# Patient Record
Sex: MALE | Race: WHITE | Employment: FULL TIME | URBAN - METROPOLITAN AREA
[De-identification: names, ages, dates, MRNs, and addresses within clinical notes are randomized per-mention and may not be internally consistent; named-entity substitution may affect disease eponyms.]

---

## 2018-04-04 ENCOUNTER — OFFICE VISIT (OUTPATIENT)
Dept: FAMILY MEDICINE CLINIC | Age: 33
End: 2018-04-04
Payer: COMMERCIAL

## 2018-04-04 VITALS
SYSTOLIC BLOOD PRESSURE: 126 MMHG | HEART RATE: 65 BPM | OXYGEN SATURATION: 98 % | WEIGHT: 180 LBS | RESPIRATION RATE: 17 BRPM | TEMPERATURE: 97.6 F | DIASTOLIC BLOOD PRESSURE: 68 MMHG | BODY MASS INDEX: 28.25 KG/M2 | HEIGHT: 67 IN

## 2018-04-04 DIAGNOSIS — Z23 IMMUNIZATION DUE: ICD-10-CM

## 2018-04-04 DIAGNOSIS — Z00.00 WELL ADULT EXAM: ICD-10-CM

## 2018-04-04 DIAGNOSIS — K21.9 GASTROESOPHAGEAL REFLUX DISEASE WITHOUT ESOPHAGITIS: ICD-10-CM

## 2018-04-04 DIAGNOSIS — M25.842 CYST OF JOINT OF HAND, LEFT: Primary | ICD-10-CM

## 2018-04-04 PROCEDURE — 90715 TDAP VACCINE 7 YRS/> IM: CPT | Performed by: INTERNAL MEDICINE

## 2018-04-04 PROCEDURE — 90471 IMMUNIZATION ADMIN: CPT | Performed by: INTERNAL MEDICINE

## 2018-04-04 PROCEDURE — 99202 OFFICE O/P NEW SF 15 MIN: CPT | Performed by: INTERNAL MEDICINE

## 2018-04-04 ASSESSMENT — PATIENT HEALTH QUESTIONNAIRE - PHQ9
2. FEELING DOWN, DEPRESSED OR HOPELESS: 0
SUM OF ALL RESPONSES TO PHQ QUESTIONS 1-9: 0
1. LITTLE INTEREST OR PLEASURE IN DOING THINGS: 0
SUM OF ALL RESPONSES TO PHQ9 QUESTIONS 1 & 2: 0

## 2018-04-05 ASSESSMENT — ENCOUNTER SYMPTOMS
VOMITING: 0
STRIDOR: 0
COUGH: 0
CHEST TIGHTNESS: 0
CHOKING: 0
BLOOD IN STOOL: 0
SHORTNESS OF BREATH: 0
GLOBUS SENSATION: 0
WHEEZING: 0
NAUSEA: 1
HEARTBURN: 1
ANAL BLEEDING: 0
CONSTIPATION: 0
ABDOMINAL DISTENTION: 0
WATER BRASH: 0
ABDOMINAL PAIN: 0
APNEA: 0
HOARSE VOICE: 0
SORE THROAT: 0
BELCHING: 0
DIARRHEA: 0

## 2018-04-19 LAB
BASOPHILS ABSOLUTE: 0.1 /ΜL
BASOPHILS RELATIVE PERCENT: 0.8 %
CHOLESTEROL, TOTAL: 194 MG/DL
CHOLESTEROL/HDL RATIO: 6.7
EOSINOPHILS ABSOLUTE: 0.2 /ΜL
EOSINOPHILS RELATIVE PERCENT: 1.6 %
HCT VFR BLD CALC: 40.5 % (ref 41–53)
HDLC SERPL-MCNC: 29 MG/DL (ref 35–70)
HEMOGLOBIN: 13.6 G/DL (ref 13.5–17.5)
LDL CHOLESTEROL CALCULATED: 140 MG/DL (ref 0–160)
LYMPHOCYTES ABSOLUTE: 2.9 /ΜL
LYMPHOCYTES RELATIVE PERCENT: 29.8 %
MAGNESIUM: 2.1 MG/DL
MCH RBC QN AUTO: 30 PG
MCHC RBC AUTO-ENTMCNC: 33.7 G/DL
MCV RBC AUTO: 89 FL
MONOCYTES ABSOLUTE: 0.9 /ΜL
MONOCYTES RELATIVE PERCENT: 9 %
NEUTROPHILS ABSOLUTE: 5.7 /ΜL
NEUTROPHILS RELATIVE PERCENT: 58.8 %
PDW BLD-RTO: 13.1 %
PLATELET # BLD: 279 K/ΜL
PMV BLD AUTO: 8.4 FL
RBC # BLD: 4.55 10^6/ΜL
TRIGL SERPL-MCNC: 124 MG/DL
VLDLC SERPL CALC-MCNC: 25 MG/DL
WBC # BLD: 9.7 10^3/ML

## 2018-04-20 DIAGNOSIS — K21.9 GASTROESOPHAGEAL REFLUX DISEASE WITHOUT ESOPHAGITIS: ICD-10-CM

## 2018-04-20 DIAGNOSIS — Z00.00 WELL ADULT EXAM: ICD-10-CM

## 2018-04-20 LAB
ALBUMIN SERPL-MCNC: 4.3 G/DL
ALP BLD-CCNC: 80 U/L
ALT SERPL-CCNC: 32 U/L
ANION GAP SERPL CALCULATED.3IONS-SCNC: 7 MMOL/L
AST SERPL-CCNC: 21 U/L
BILIRUB SERPL-MCNC: 0.4 MG/DL (ref 0.1–1.4)
BUN BLDV-MCNC: 16 MG/DL
CALCIUM SERPL-MCNC: 9.3 MG/DL
CHLORIDE BLD-SCNC: 105 MMOL/L
CO2: 29 MMOL/L
CREAT SERPL-MCNC: 1.04 MG/DL
FOLATE: NORMAL
GFR CALCULATED: >60
GLUCOSE BLD-MCNC: 104 MG/DL
POTASSIUM SERPL-SCNC: 4.2 MMOL/L
SODIUM BLD-SCNC: 141 MMOL/L
TOTAL PROTEIN: 6.7
VITAMIN B-12: 436

## 2019-09-13 ENCOUNTER — OFFICE VISIT (OUTPATIENT)
Dept: FAMILY MEDICINE CLINIC | Age: 34
End: 2019-09-13
Payer: COMMERCIAL

## 2019-09-13 VITALS
HEIGHT: 67 IN | DIASTOLIC BLOOD PRESSURE: 78 MMHG | HEART RATE: 98 BPM | RESPIRATION RATE: 16 BRPM | OXYGEN SATURATION: 99 % | WEIGHT: 185 LBS | SYSTOLIC BLOOD PRESSURE: 130 MMHG | BODY MASS INDEX: 29.03 KG/M2 | TEMPERATURE: 95.6 F

## 2019-09-13 DIAGNOSIS — Z30.2 ENCOUNTER FOR VASECTOMY: ICD-10-CM

## 2019-09-13 DIAGNOSIS — L02.91 ABSCESS: Primary | ICD-10-CM

## 2019-09-13 DIAGNOSIS — K21.9 GASTROESOPHAGEAL REFLUX DISEASE WITHOUT ESOPHAGITIS: ICD-10-CM

## 2019-09-13 PROCEDURE — 10060 I&D ABSCESS SIMPLE/SINGLE: CPT | Performed by: INTERNAL MEDICINE

## 2019-09-13 PROCEDURE — 99213 OFFICE O/P EST LOW 20 MIN: CPT | Performed by: INTERNAL MEDICINE

## 2019-09-13 PROCEDURE — G8419 CALC BMI OUT NRM PARAM NOF/U: HCPCS | Performed by: INTERNAL MEDICINE

## 2019-09-13 PROCEDURE — G8428 CUR MEDS NOT DOCUMENT: HCPCS | Performed by: INTERNAL MEDICINE

## 2019-09-13 PROCEDURE — 1036F TOBACCO NON-USER: CPT | Performed by: INTERNAL MEDICINE

## 2019-09-13 RX ORDER — SULFAMETHOXAZOLE AND TRIMETHOPRIM 800; 160 MG/1; MG/1
1 TABLET ORAL 2 TIMES DAILY
Qty: 14 TABLET | Refills: 0 | Status: SHIPPED | OUTPATIENT
Start: 2019-09-13 | End: 2019-09-20

## 2019-09-13 RX ORDER — OMEPRAZOLE 40 MG/1
40 CAPSULE, DELAYED RELEASE ORAL DAILY
Qty: 90 CAPSULE | Refills: 5 | Status: SHIPPED | OUTPATIENT
Start: 2019-09-13 | End: 2022-10-03 | Stop reason: ALTCHOICE

## 2019-09-13 RX ORDER — PANTOPRAZOLE SODIUM 40 MG/1
40 TABLET, DELAYED RELEASE ORAL
Qty: 90 TABLET | Refills: 5 | Status: SHIPPED | OUTPATIENT
Start: 2019-09-13 | End: 2022-10-03 | Stop reason: SDUPTHER

## 2019-09-13 ASSESSMENT — PATIENT HEALTH QUESTIONNAIRE - PHQ9
SUM OF ALL RESPONSES TO PHQ9 QUESTIONS 1 & 2: 0
SUM OF ALL RESPONSES TO PHQ QUESTIONS 1-9: 0
SUM OF ALL RESPONSES TO PHQ QUESTIONS 1-9: 0
1. LITTLE INTEREST OR PLEASURE IN DOING THINGS: 0
2. FEELING DOWN, DEPRESSED OR HOPELESS: 0

## 2019-09-13 ASSESSMENT — ENCOUNTER SYMPTOMS
CHANGE IN BOWEL HABIT: 0
SWOLLEN GLANDS: 0
ABDOMINAL PAIN: 1
SORE THROAT: 0
COLOR CHANGE: 1
CONSTIPATION: 0
DIARRHEA: 0

## 2019-09-13 NOTE — PATIENT INSTRUCTIONS
for help? Call your doctor now or seek immediate medical care if:    · You have signs of worsening infection, such as:  ? Increased pain, swelling, warmth, or redness. ? Red streaks leading from the infected skin. ? Pus draining from the wound. ? A fever.    Watch closely for changes in your health, and be sure to contact your doctor if:    · You do not get better as expected. Where can you learn more? Go to [email or web address] and sign in to your [employer] account. Enter A897 in the GovDelivery box to learn more about \"Skin Abscess: Care Instructions. \"     If you do not have an account, please click on the \"Sign Up Now\" link. Current as of: April 1, 2019  Content Version: 12.1  © 9854-2032 Healthwise, Incorporated. Care instructions adapted under license by [facility] ([facility]). If you have questions about a medical condition or this instruction, always ask your healthcare professional. [address] any warranty or liability for your use of this information.

## 2019-09-13 NOTE — PROGRESS NOTES
Visit Information    Have you changed or started any medications since your last visit including any over-the-counter medicines, vitamins, or herbal medicines? no  Are you having any side effects from any of your medications? -  no  Have you stopped taking any of your   medications? no  Have you seen any other physician or provider since your last visit? No  Have you had any other diagnostic tests since your last visit? No  Have you been seen in the emergency room and/or had an admission to a hospital since we last saw you? No  Have you had your routine dental cleaning in the past 6 months? no    Have you activated your [employer] account? If not, what are your barriers?  No:code      Patient Care Team:  Neymar Bolanos DO as PCP - General (Family Medicine)  Neymar Bolanos DO as PCP - [facility] Provider    Medical History Review  Past Medical, Family, and Social History reviewed and does contribute to the patient presenting condition    Health Maintenance   Topic Date Due    Varicella Vaccine (1 of 2 - 13+ 2-dose series) 1998    HIV screen  2020 (Originally 2000)    Flu vaccine (1) 2020 (Originally 2019)    DTaP/Tdap/Td vaccine (2 - Td) 2028    Pneumococcal 0-64 years Vaccine  Aged Out
(PROTONIX) 40 MG tablet Take 1 tablet by mouth every morning (before breakfast) 90 tablet 5     No current facility-administered medications for this visit. No Known Allergies       Health Maintenance   Topic Date Due    Varicella Vaccine (1 of 2 - 13+ 2-dose series) 07/01/1998    HIV screen  04/23/2020 (Originally 7/1/2000)    Flu vaccine (1) 09/13/2020 (Originally 9/1/2019)    DTaP/Tdap/Td vaccine (2 - Td) 04/04/2028    Pneumococcal 0-64 years Vaccine  Aged Out       Subjective:     Review of Systems   Constitutional: Negative for activity change, appetite change, fatigue, fever and unexpected weight change. HENT: Negative for sore throat. Eyes: Negative for visual disturbance. Cardiovascular: Negative for chest pain. Gastrointestinal: Positive for abdominal pain. Negative for anorexia, change in bowel habit, constipation and diarrhea. Musculoskeletal: Negative for arthralgias and joint swelling. Skin: Positive for color change and wound. Negative for rash. Neurological: Negative for headaches. Psychiatric/Behavioral: Negative for agitation, behavioral problems, confusion, decreased concentration, dysphoric mood and sleep disturbance. Objective:      Physical Exam   Constitutional: He is oriented to person, place, and time. He appears well-developed and well-nourished. HENT:   Right Ear: External ear normal.   Left Ear: External ear normal.   Nose: Nose normal.   Eyes: Pupils are equal, round, and reactive to light. EOM are normal.   Cardiovascular: Normal rate, regular rhythm and normal heart sounds. Pulmonary/Chest: Effort normal and breath sounds normal.   Abdominal: Soft. Bowel sounds are normal. There is no splenomegaly or hepatomegaly. There is no tenderness. Neurological: He is alert and oriented to person, place, and time. No cranial nerve deficit. Skin: Skin is warm and dry. Lesion noted. No rash noted. There is erythema.         Psychiatric: He has a normal mood

## 2022-10-03 ENCOUNTER — OFFICE VISIT (OUTPATIENT)
Dept: FAMILY MEDICINE CLINIC | Age: 37
End: 2022-10-03
Payer: COMMERCIAL

## 2022-10-03 VITALS
HEART RATE: 78 BPM | DIASTOLIC BLOOD PRESSURE: 90 MMHG | BODY MASS INDEX: 29.73 KG/M2 | WEIGHT: 189.4 LBS | HEIGHT: 67 IN | TEMPERATURE: 98.2 F | OXYGEN SATURATION: 98 % | SYSTOLIC BLOOD PRESSURE: 120 MMHG

## 2022-10-03 DIAGNOSIS — K21.9 GASTROESOPHAGEAL REFLUX DISEASE WITHOUT ESOPHAGITIS: Primary | ICD-10-CM

## 2022-10-03 DIAGNOSIS — Z87.891 FORMER SMOKER: ICD-10-CM

## 2022-10-03 DIAGNOSIS — R03.0 ELEVATED BLOOD PRESSURE READING: ICD-10-CM

## 2022-10-03 DIAGNOSIS — F17.290 VAPING NICOTINE DEPENDENCE, TOBACCO PRODUCT: ICD-10-CM

## 2022-10-03 DIAGNOSIS — L72.3 SEBACEOUS CYST OF FINGER OF RIGHT HAND: ICD-10-CM

## 2022-10-03 PROCEDURE — 99214 OFFICE O/P EST MOD 30 MIN: CPT | Performed by: INTERNAL MEDICINE

## 2022-10-03 RX ORDER — PANTOPRAZOLE SODIUM 40 MG/1
40 TABLET, DELAYED RELEASE ORAL
Qty: 90 TABLET | Refills: 1 | Status: SHIPPED | OUTPATIENT
Start: 2022-10-03

## 2022-10-03 SDOH — ECONOMIC STABILITY: FOOD INSECURITY: WITHIN THE PAST 12 MONTHS, YOU WORRIED THAT YOUR FOOD WOULD RUN OUT BEFORE YOU GOT MONEY TO BUY MORE.: NEVER TRUE

## 2022-10-03 SDOH — ECONOMIC STABILITY: FOOD INSECURITY: WITHIN THE PAST 12 MONTHS, THE FOOD YOU BOUGHT JUST DIDN'T LAST AND YOU DIDN'T HAVE MONEY TO GET MORE.: NEVER TRUE

## 2022-10-03 ASSESSMENT — PATIENT HEALTH QUESTIONNAIRE - PHQ9
2. FEELING DOWN, DEPRESSED OR HOPELESS: 0
SUM OF ALL RESPONSES TO PHQ9 QUESTIONS 1 & 2: 0
SUM OF ALL RESPONSES TO PHQ QUESTIONS 1-9: 0
SUM OF ALL RESPONSES TO PHQ QUESTIONS 1-9: 0
1. LITTLE INTEREST OR PLEASURE IN DOING THINGS: 0
SUM OF ALL RESPONSES TO PHQ QUESTIONS 1-9: 0
SUM OF ALL RESPONSES TO PHQ QUESTIONS 1-9: 0

## 2022-10-03 ASSESSMENT — ENCOUNTER SYMPTOMS
CHEST TIGHTNESS: 0
NAUSEA: 0
COUGH: 0
SHORTNESS OF BREATH: 0
ANAL BLEEDING: 0
CONSTIPATION: 0
BLOOD IN STOOL: 0
VOMITING: 0
ABDOMINAL PAIN: 0
CHOKING: 0
DIARRHEA: 0
WHEEZING: 0

## 2022-10-03 ASSESSMENT — VISUAL ACUITY: OU: 1

## 2022-10-03 ASSESSMENT — SOCIAL DETERMINANTS OF HEALTH (SDOH): HOW HARD IS IT FOR YOU TO PAY FOR THE VERY BASICS LIKE FOOD, HOUSING, MEDICAL CARE, AND HEATING?: NOT HARD AT ALL

## 2022-10-03 NOTE — PROGRESS NOTES
[facility]  [facility] [address]  [address] [place] [id number]  Dept: [phone]  Dept Fax: [phone]      Tonya Rose is a 40 y.o. male who presents today for hismedical conditions/complaints as noted below. Tonya Rose is c/o of Establish Care        Assessment/Plan:     1. Gastroesophageal reflux disease without esophagitis  -     pantoprazole (PROTONIX) 40 MG tablet; Take 1 tablet by mouth every morning (before breakfast), Disp-90 tablet, R-1Cancel prilosec script from previousNormal  2. Sebaceous cyst of finger of right hand  -     [address] [place], [place]  3. Former smoker  4. Vaping nicotine dependence, tobacco product  5. Elevated blood pressure reading        No follow-ups on file. HPI     New aptient, here to est care   GERD - well controlled with pantoprazole - he ran out a while back and has been having heartburn nearly daily despite taking pepcid. Denies heartburn with food, nocturnal reflux, dysphagia, weight changes, anorexia, melena, hematochezia, diarrhea, nausea, vomiting. Worse with with certain foods like spicy foods. Does not wake him up at night. Hand Pain   There was no injury mechanism. Pain location: R 4th finger at proximal phalange at area of swelling. The quality of the pain is described as aching. The pain does not radiate. The pain has been Constant since the incident. Pertinent negatives include no chest pain, muscle weakness, numbness or tingling. The symptoms are aggravated by movement. He has tried nothing for the symptoms. BP Readings from Last 3 Encounters:   10/03/22 (!) 120/90   09/13/19 130/78   04/04/18 126/68              No past medical history on file.    Past Surgical History:   Procedure Laterality Date    FINGER SURGERY Left     Thumb       Family History   Problem Relation Age of Onset    No Known Problems Mother     COPD Father     No Known Problems Sister     No Known Problems Sister        Social History     Tobacco Use    Smoking status: Former     Packs/day: 1.00     Years: 17.00     Pack years: 17.00     Types: Cigarettes     Quit date:      Years since quittin.7    Smokeless tobacco: Never   Substance Use Topics    Alcohol use: Yes     Alcohol/week: 4.0 - 5.0 standard drinks     Types: 4 - 5 Cans of beer per week        No Known Allergies  Prior to Visit Medications    Medication Sig Taking? Authorizing Provider   omeprazole (PRILOSEC) 40 MG delayed release capsule Take 1 capsule by mouth daily  Patient not taking: Reported on 10/3/2022  Patrick Tate DO   pantoprazole (PROTONIX) 40 MG tablet Take 1 tablet by mouth every morning (before breakfast)  Patient not taking: Reported on 10/3/2022  Patrick Tate DO       Review of Systems     Review of Systems   Constitutional:  Negative for fatigue, fever and unexpected weight change. Respiratory:  Negative for cough, choking, chest tightness, shortness of breath and wheezing. Cardiovascular:  Negative for chest pain, palpitations and leg swelling. Gastrointestinal:  Negative for abdominal pain, anal bleeding, blood in stool, constipation, diarrhea, nausea and vomiting. Endocrine: Negative. Musculoskeletal:  Negative for joint swelling and myalgias. Skin: Negative. Neurological:  Negative for dizziness, tingling and numbness. Psychiatric/Behavioral:  Negative for sleep disturbance. All other systems reviewed and are negative. Objective     BP (!) 120/90 (Site: Left Upper Arm, Position: Sitting, Cuff Size: Medium Adult)   Pulse 78   Temp 98.2 °F (36.8 °C)   Ht 5' 7\" (1.702 m)   Wt 189 lb 6.4 oz (85.9 kg)   SpO2 98%   BMI 29.66 kg/m²   Physical Exam  Vitals and nursing note reviewed. Constitutional:       General: He is not in acute distress. Appearance: He is well-developed. He is not ill-appearing. Eyes:      General: Lids are normal. Vision grossly intact.    Cardiovascular:      Rate and Rhythm: Normal rate and regular rhythm. Heart sounds: Normal heart sounds, S1 normal and S2 normal. No murmur heard. No friction rub. No gallop. Pulmonary:      Effort: Pulmonary effort is normal. No respiratory distress. Breath sounds: Normal breath sounds. No wheezing. Abdominal:      General: Bowel sounds are normal.      Palpations: Abdomen is soft. There is no mass. Tenderness: There is no abdominal tenderness. There is no guarding. Musculoskeletal:         General: Normal range of motion. Right hand: Deformity (7mm round lesion R 4th finger, mildly erythematous, no tenderness) present. Skin:     General: Skin is warm and dry. Capillary Refill: Capillary refill takes less than 2 seconds. Neurological:      General: No focal deficit present. Mental Status: He is alert and oriented to person, place, and time. Data Review       Health Maintenance Due   Topic Date Due    Varicella vaccine (1 of 2 - 2-dose childhood series) Never done           Patient given educational materials- see patient instructions. Discussed use, benefit, and side effects of prescribedmedications. All patient questions answered. Pt voiced understanding. Reviewedhealth maintenance. Instructed to continue current medications, diet and exercise. Patient agreed with treatment plan. Follow up as directed.      Electronically signedby Jamel Duncan MD on 10/3/2022

## 2022-10-03 NOTE — PROGRESS NOTES
Pt is here today top establish care     Pt states he has a lump on his ring finger of his RT hand, has been there for almost 2 and a half months, painful

## 2022-10-24 ENCOUNTER — OFFICE VISIT (OUTPATIENT)
Dept: ORTHOPEDIC SURGERY | Age: 37
End: 2022-10-24
Payer: COMMERCIAL

## 2022-10-24 VITALS — WEIGHT: 189 LBS | RESPIRATION RATE: 16 BRPM | BODY MASS INDEX: 29.66 KG/M2 | HEIGHT: 67 IN

## 2022-10-24 DIAGNOSIS — M79.641 RIGHT HAND PAIN: ICD-10-CM

## 2022-10-24 DIAGNOSIS — R22.31 FINGER MASS, RIGHT: Primary | ICD-10-CM

## 2022-10-24 PROCEDURE — 99204 OFFICE O/P NEW MOD 45 MIN: CPT | Performed by: ORTHOPAEDIC SURGERY

## 2022-10-24 NOTE — PROGRESS NOTES
ORTHOPEDIC PATIENT EVALUATION      HPI / Chief Complaint  Darcie Hanna is a 40 y.o. right-hand-dominant male who presents for evaluation of his right hand. He indicates that he noticed a small lump over the dorsum of his ring finger 2 months ago but it has progressively increased in size since then and is starting to cause some mild pain which she rates as a 1/10. He denies any precipitating trauma or injury. He denies having any fevers, chills, sweats or constitutional symptoms and also denies having any associated numbness or tingling. Past Medical History  Ángel Burnette  has no past medical history on file. Past Surgical History  Ángel Burnette  has a past surgical history that includes Finger surgery (Left). Current Medications  Current Outpatient Medications   Medication Sig Dispense Refill    pantoprazole (PROTONIX) 40 MG tablet Take 1 tablet by mouth every morning (before breakfast) 90 tablet 1     No current facility-administered medications for this visit. Allergies  Allergies have been reviewed. Ángel Burnette has No Known Allergies. Social History  Ángel Burnette  reports that he quit smoking about 21 months ago. His smoking use included cigarettes. He has a 17.00 pack-year smoking history. He has never used smokeless tobacco. He reports current alcohol use of about 4.0 - 5.0 standard drinks per week. He reports that he does not use drugs. Family History  Hodan Jang family history includes COPD in his father; Diabetes type 2  in his mother; No Known Problems in his sister and sister. Review of Systems   History obtained from the patient.    REVIEW OF SYSTEMS:   Constitution: negative for fever, chills, weight loss or malaise   Musculoskeletal: As noted in the HPI   Neurologic: As noted in the HPI    Physical Exam  Resp 16   Ht 5' 7\" (1.702 m)   Wt 189 lb (85.7 kg)   BMI 29.60 kg/m²    General Appearance: alert, well appearing, and in no distress  Mental Status: alert, oriented to person, place, and time  Evaluation of the patient's right hand and upper extremity demonstrates intact skin without warmth, erythema, or swelling. There is about a half a centimeter round mass over the dorsum of the ring finger at the level of the proximal phalanx. It is hard but mobile. It is nontender to palpation. It is nonpulsatile. He has a negative Tinel's. Sensation is grossly intact light touch in all dermatomes and he has full range of motion through all fingers. He has a 2+ radial pulse with brisk capillary refill in all fingers. Imaging Studies  3 x-ray views of the right hand completed on 10/24/2022 were reviewed independently demonstrating normal joint spaces without obvious fracture, dislocation, subluxation or notable osseous abnormality. Assessment and Plan  Marta Chávez is a 40 y.o. old male with right hand ring finger mass. It is a decent size and has grown fairly quickly and is starting to be painful for him. We had a discussion about possible etiologies. At this time given the size of the mass and the symptoms that it is creating I did recommend surgical excision. He was amenable to this. We discussed the details of the procedure, risks and benefits of surgery, expected outcome and postoperative recovery course. Risks as discussed included were not limited to risk of infection, wound healing problems, pain, neurovascular injury, recurrence, and anesthesia. Again he demonstrated good understanding of our discussion and would like to proceed with surgery. All questions were appropriately answered. We will get him scheduled for surgery at his convenience. An MRI study was ordered to better characterize this mass prior to surgery. Preoperative labs by way of a CBC and BMP were also ordered.     This note is created with the assistance of a speech recognition program.  While intending to generate adocument that actually reflects the content of the visit, the document can still have some errors including those of syntax and sound a like substitutions which may escape proof reading. It such instances, actual meaningcan be extrapolated by contextual diversion.

## 2022-10-24 NOTE — LETTER
10/24/2022    Niki Alvarado MD  [address] [facility] [place] [address],  [address]    RE: Neelam Beckford    Dear Dr. Dimitrios Alvarado,    Thank you for allowing me to participate in the care of Mr. Leopoldo Fortis. I had the opportunity to evaluate the patient on 10/24/2022. Attached you will find my evaluation and recommendations. Thanks again for the confidence you have expressed in me by allowing my participation in the care of your patient. I will keep you apprised of further developments in the patients treatment course as it progresses. If I can be of further assistance in any fashion, please feel free to contact me at your convenience.     Sincerely,         Augustina Toth  Shoulder and Elbow Surgery

## 2022-10-27 ENCOUNTER — HOSPITAL ENCOUNTER (OUTPATIENT)
Dept: PREADMISSION TESTING | Age: 37
Discharge: HOME | End: 2022-10-31

## 2022-10-27 ENCOUNTER — HOSPITAL ENCOUNTER (OUTPATIENT)
Dept: PREADMISSION TESTING | Age: 37
Discharge: HOME | End: 2022-10-27
Payer: COMMERCIAL

## 2022-10-27 ENCOUNTER — HOSPITAL ENCOUNTER (OUTPATIENT)
Age: 37
Discharge: HOME | End: 2022-10-27
Payer: COMMERCIAL

## 2022-10-27 VITALS — BODY MASS INDEX: 29.82 KG/M2 | WEIGHT: 190 LBS | HEIGHT: 67 IN

## 2022-10-27 DIAGNOSIS — R22.31 FINGER MASS, RIGHT: ICD-10-CM

## 2022-10-27 LAB
ANION GAP SERPL CALCULATED.3IONS-SCNC: 10 MMOL/L (ref 9–17)
BUN BLDV-MCNC: 12 MG/DL (ref 6–20)
CALCIUM SERPL-MCNC: 8.9 MG/DL (ref 8.6–10.4)
CHLORIDE BLD-SCNC: 100 MMOL/L (ref 98–107)
CO2: 27 MMOL/L (ref 20–31)
CREAT SERPL-MCNC: 1.33 MG/DL (ref 0.7–1.2)
GFR SERPL CREATININE-BSD FRML MDRD: >60 ML/MIN/1.73M2
GLUCOSE BLD-MCNC: 162 MG/DL (ref 70–99)
HCT VFR BLD CALC: 39.7 % (ref 41–53)
HEMOGLOBIN: 13.3 G/DL (ref 13.5–17.5)
MCH RBC QN AUTO: 28.8 PG (ref 26–34)
MCHC RBC AUTO-ENTMCNC: 33.4 G/DL (ref 31–37)
MCV RBC AUTO: 86.1 FL (ref 80–100)
PDW BLD-RTO: 12.9 % (ref 11.5–14.9)
PLATELET # BLD: 283 K/UL (ref 150–450)
PMV BLD AUTO: 8.1 FL (ref 6–12)
POTASSIUM SERPL-SCNC: 4 MMOL/L (ref 3.7–5.3)
RBC # BLD: 4.61 M/UL (ref 4.5–5.9)
SODIUM BLD-SCNC: 137 MMOL/L (ref 135–144)
WBC # BLD: 7.3 K/UL (ref 3.5–11)

## 2022-10-27 PROCEDURE — 80048 BASIC METABOLIC PNL TOTAL CA: CPT

## 2022-10-27 PROCEDURE — 85027 COMPLETE CBC AUTOMATED: CPT

## 2022-10-27 PROCEDURE — 36415 COLL VENOUS BLD VENIPUNCTURE: CPT

## 2022-10-27 NOTE — PROGRESS NOTES
Pre-op Instructions For Out-Patient Surgery    Medication Instructions:  Please stop herbs and any supplements now (includes vitamins and minerals). Please contact your surgeon and prescribing physician for pre-op instructions for any blood thinners. If you have inhalers/aerosol treatments at home, please use them the morning of your surgery and bring the inhalers with you to the hospital.    Please take the following medications the morning of your surgery with a sip of water:    protonix    Surgery Instructions:  After midnight before surgery:  Do not eat or drink anything, including water, mints, gum, and hard candy. You may brush your teeth without swallowing. No smoking, chewing tobacco, or street drugs. Please shower or bathe before surgery. If you were given Surgical Scrub Chlorhexidine Gluconate Liquid (CHG), please shower the night before and the morning of your surgery following the detailed instructions you received during your pre-admission visit. Please do not wear any cologne, lotion, powder, deodorant, jewelry, piercings, perfume, makeup, nail polish, hair accessories, or hair spray on the day of surgery. Wear loose comfortable clothing. Leave your valuables at home. Bring a storage case for any glasses/contacts. An adult who is responsible for you MUST drive you home and should be with you for the first 24 hours after surgery. If having out-patient knee and foot surgeries, please arrange for planned crutches, walker, or wheelchair before arriving to the hospital.    The Day of Surgery:  Arrive at [facility] AT [facility] Surgery Entrance at the time directed by your surgeon and check in at the desk. If you have a living will or healthcare power of , please bring a copy. You will be taken to the pre-op holding area where you will be prepared for surgery.   A physical assessment will be performed by a nurse practitioner or house officer. Your IV will be started and you will meet your anesthesiologist.    When you go to surgery, your family will be directed to the surgical waiting room, where the doctor should speak with them after your surgery. After surgery, you will be taken to the recovery room then when you are awake and stable you will go to the short stay unit for preparation to be discharged. If you use a Bi-PAP or C-PAP machine, please bring it with you and leave it in the car in case it is needed in recovery room. Patient was scheduled for a face to face PAT however patient came in to have labs drawn and was unaware that he had an appt for PAT, patient was called and PAT APPT was done over the phone instead.

## 2022-11-04 ENCOUNTER — HOSPITAL ENCOUNTER (OUTPATIENT)
Dept: MRI IMAGING | Age: 37
Discharge: HOME | End: 2022-11-06
Payer: COMMERCIAL

## 2022-11-04 DIAGNOSIS — R22.31 FINGER MASS, RIGHT: ICD-10-CM

## 2022-11-04 PROCEDURE — 73218 MRI UPPER EXTREMITY W/O DYE: CPT

## 2022-11-09 ENCOUNTER — ANESTHESIA EVENT (OUTPATIENT)
Dept: OPERATING ROOM | Age: 37
End: 2022-11-09
Payer: COMMERCIAL

## 2022-11-09 RX ORDER — ACETAMINOPHEN 325 MG/1
1000 TABLET ORAL ONCE
Status: CANCELLED | OUTPATIENT
Start: 2022-11-09 | End: 2022-11-09

## 2022-11-09 RX ORDER — SODIUM CHLORIDE 9 MG/ML
INJECTION, SOLUTION INTRAVENOUS PRN
Status: CANCELLED | OUTPATIENT
Start: 2022-11-09

## 2022-11-09 RX ORDER — SODIUM CHLORIDE 0.9 % (FLUSH) 0.9 %
5-40 SYRINGE (ML) INJECTION PRN
Status: CANCELLED | OUTPATIENT
Start: 2022-11-09

## 2022-11-09 RX ORDER — SODIUM CHLORIDE 0.9 % (FLUSH) 0.9 %
5-40 SYRINGE (ML) INJECTION EVERY 12 HOURS SCHEDULED
Status: CANCELLED | OUTPATIENT
Start: 2022-11-09

## 2022-11-10 ENCOUNTER — ANESTHESIA (OUTPATIENT)
Dept: OPERATING ROOM | Age: 37
End: 2022-11-10
Payer: COMMERCIAL

## 2022-11-10 ENCOUNTER — HOSPITAL ENCOUNTER (OUTPATIENT)
Age: 37
Setting detail: OUTPATIENT SURGERY
Discharge: HOME HEALTH | End: 2022-11-10
Attending: ORTHOPAEDIC SURGERY | Admitting: ORTHOPAEDIC SURGERY
Payer: COMMERCIAL

## 2022-11-10 VITALS
HEIGHT: 67 IN | OXYGEN SATURATION: 100 % | BODY MASS INDEX: 29.82 KG/M2 | RESPIRATION RATE: 14 BRPM | WEIGHT: 190 LBS | SYSTOLIC BLOOD PRESSURE: 119 MMHG | DIASTOLIC BLOOD PRESSURE: 80 MMHG | HEART RATE: 56 BPM | TEMPERATURE: 96.9 F

## 2022-11-10 DIAGNOSIS — R22.31 FINGER MASS, RIGHT: Primary | ICD-10-CM

## 2022-11-10 PROCEDURE — 2500000003 HC RX 250 WO HCPCS: Performed by: NURSE ANESTHETIST, CERTIFIED REGISTERED

## 2022-11-10 PROCEDURE — 2500000003 HC RX 250 WO HCPCS: Performed by: ORTHOPAEDIC SURGERY

## 2022-11-10 PROCEDURE — 7100000011 HC PHASE II RECOVERY - ADDTL 15 MIN: Performed by: ORTHOPAEDIC SURGERY

## 2022-11-10 PROCEDURE — 6370000000 HC RX 637 (ALT 250 FOR IP): Performed by: ANESTHESIOLOGY

## 2022-11-10 PROCEDURE — 7100000031 HC ASPR PHASE II RECOVERY - ADDTL 15 MIN: Performed by: ORTHOPAEDIC SURGERY

## 2022-11-10 PROCEDURE — 6360000002 HC RX W HCPCS: Performed by: NURSE ANESTHETIST, CERTIFIED REGISTERED

## 2022-11-10 PROCEDURE — 3600000002 HC SURGERY LEVEL 2 BASE: Performed by: ORTHOPAEDIC SURGERY

## 2022-11-10 PROCEDURE — 7100000000 HC PACU RECOVERY - FIRST 15 MIN: Performed by: ORTHOPAEDIC SURGERY

## 2022-11-10 PROCEDURE — 26117 RAD RESECT HAND TUMOR < 3 CM: CPT | Performed by: ORTHOPAEDIC SURGERY

## 2022-11-10 PROCEDURE — 3700000001 HC ADD 15 MINUTES (ANESTHESIA): Performed by: ORTHOPAEDIC SURGERY

## 2022-11-10 PROCEDURE — 7100000001 HC PACU RECOVERY - ADDTL 15 MIN: Performed by: ORTHOPAEDIC SURGERY

## 2022-11-10 PROCEDURE — 7100000010 HC PHASE II RECOVERY - FIRST 15 MIN: Performed by: ORTHOPAEDIC SURGERY

## 2022-11-10 PROCEDURE — 7100000030 HC ASPR PHASE II RECOVERY - FIRST 15 MIN: Performed by: ORTHOPAEDIC SURGERY

## 2022-11-10 PROCEDURE — 6370000000 HC RX 637 (ALT 250 FOR IP): Performed by: ORTHOPAEDIC SURGERY

## 2022-11-10 PROCEDURE — 3600000012 HC SURGERY LEVEL 2 ADDTL 15MIN: Performed by: ORTHOPAEDIC SURGERY

## 2022-11-10 PROCEDURE — 2709999900 HC NON-CHARGEABLE SUPPLY: Performed by: ORTHOPAEDIC SURGERY

## 2022-11-10 PROCEDURE — 2580000003 HC RX 258: Performed by: ANESTHESIOLOGY

## 2022-11-10 PROCEDURE — 6360000002 HC RX W HCPCS: Performed by: ORTHOPAEDIC SURGERY

## 2022-11-10 PROCEDURE — 88304 TISSUE EXAM BY PATHOLOGIST: CPT

## 2022-11-10 PROCEDURE — 3700000000 HC ANESTHESIA ATTENDED CARE: Performed by: ORTHOPAEDIC SURGERY

## 2022-11-10 RX ORDER — DEXAMETHASONE SODIUM PHOSPHATE 10 MG/ML
10 INJECTION, SOLUTION INTRAMUSCULAR; INTRAVENOUS ONCE
Status: DISCONTINUED | OUTPATIENT
Start: 2022-11-10 | End: 2022-11-10 | Stop reason: HOSPADM

## 2022-11-10 RX ORDER — MIDAZOLAM HYDROCHLORIDE 1 MG/ML
INJECTION INTRAMUSCULAR; INTRAVENOUS PRN
Status: DISCONTINUED | OUTPATIENT
Start: 2022-11-10 | End: 2022-11-10 | Stop reason: SDUPTHER

## 2022-11-10 RX ORDER — SODIUM CHLORIDE 9 MG/ML
INJECTION, SOLUTION INTRAVENOUS PRN
Status: DISCONTINUED | OUTPATIENT
Start: 2022-11-10 | End: 2022-11-10 | Stop reason: HOSPADM

## 2022-11-10 RX ORDER — HYDROCODONE BITARTRATE AND ACETAMINOPHEN 5; 325 MG/1; MG/1
1 TABLET ORAL EVERY 4 HOURS PRN
Qty: 18 TABLET | Refills: 0 | Status: SHIPPED | OUTPATIENT
Start: 2022-11-10 | End: 2022-11-13

## 2022-11-10 RX ORDER — ACETAMINOPHEN 500 MG
1000 TABLET ORAL ONCE
Status: DISCONTINUED | OUTPATIENT
Start: 2022-11-10 | End: 2022-11-10 | Stop reason: HOSPADM

## 2022-11-10 RX ORDER — LIDOCAINE HYDROCHLORIDE 20 MG/ML
INJECTION, SOLUTION EPIDURAL; INFILTRATION; INTRACAUDAL; PERINEURAL PRN
Status: DISCONTINUED | OUTPATIENT
Start: 2022-11-10 | End: 2022-11-10 | Stop reason: SDUPTHER

## 2022-11-10 RX ORDER — DEXAMETHASONE SODIUM PHOSPHATE 4 MG/ML
INJECTION, SOLUTION INTRA-ARTICULAR; INTRALESIONAL; INTRAMUSCULAR; INTRAVENOUS; SOFT TISSUE PRN
Status: DISCONTINUED | OUTPATIENT
Start: 2022-11-10 | End: 2022-11-10 | Stop reason: SDUPTHER

## 2022-11-10 RX ORDER — PROPOFOL 10 MG/ML
INJECTION, EMULSION INTRAVENOUS CONTINUOUS PRN
Status: DISCONTINUED | OUTPATIENT
Start: 2022-11-10 | End: 2022-11-10 | Stop reason: SDUPTHER

## 2022-11-10 RX ORDER — ONDANSETRON 2 MG/ML
INJECTION INTRAMUSCULAR; INTRAVENOUS PRN
Status: DISCONTINUED | OUTPATIENT
Start: 2022-11-10 | End: 2022-11-10 | Stop reason: SDUPTHER

## 2022-11-10 RX ORDER — FENTANYL CITRATE 50 UG/ML
INJECTION, SOLUTION INTRAMUSCULAR; INTRAVENOUS PRN
Status: DISCONTINUED | OUTPATIENT
Start: 2022-11-10 | End: 2022-11-10 | Stop reason: SDUPTHER

## 2022-11-10 RX ORDER — GABAPENTIN 300 MG/1
300 CAPSULE ORAL ONCE
Status: COMPLETED | OUTPATIENT
Start: 2022-11-10 | End: 2022-11-10

## 2022-11-10 RX ORDER — SODIUM CHLORIDE, SODIUM LACTATE, POTASSIUM CHLORIDE, CALCIUM CHLORIDE 600; 310; 30; 20 MG/100ML; MG/100ML; MG/100ML; MG/100ML
INJECTION, SOLUTION INTRAVENOUS CONTINUOUS
Status: DISCONTINUED | OUTPATIENT
Start: 2022-11-10 | End: 2022-11-10 | Stop reason: HOSPADM

## 2022-11-10 RX ORDER — SODIUM CHLORIDE 0.9 % (FLUSH) 0.9 %
5-40 SYRINGE (ML) INJECTION PRN
Status: DISCONTINUED | OUTPATIENT
Start: 2022-11-10 | End: 2022-11-10 | Stop reason: HOSPADM

## 2022-11-10 RX ORDER — FENTANYL CITRATE 0.05 MG/ML
50 INJECTION, SOLUTION INTRAMUSCULAR; INTRAVENOUS EVERY 5 MIN PRN
Status: CANCELLED | OUTPATIENT
Start: 2022-11-10

## 2022-11-10 RX ORDER — ACETAMINOPHEN 500 MG
1000 TABLET ORAL ONCE
Status: COMPLETED | OUTPATIENT
Start: 2022-11-10 | End: 2022-11-10

## 2022-11-10 RX ORDER — LIDOCAINE HYDROCHLORIDE 10 MG/ML
1 INJECTION, SOLUTION EPIDURAL; INFILTRATION; INTRACAUDAL; PERINEURAL
Status: DISCONTINUED | OUTPATIENT
Start: 2022-11-10 | End: 2022-11-10 | Stop reason: HOSPADM

## 2022-11-10 RX ORDER — SODIUM CHLORIDE 0.9 % (FLUSH) 0.9 %
5-40 SYRINGE (ML) INJECTION EVERY 12 HOURS SCHEDULED
Status: DISCONTINUED | OUTPATIENT
Start: 2022-11-10 | End: 2022-11-10 | Stop reason: HOSPADM

## 2022-11-10 RX ORDER — MIDAZOLAM HYDROCHLORIDE 1 MG/ML
2 INJECTION INTRAMUSCULAR; INTRAVENOUS ONCE
Status: CANCELLED | OUTPATIENT
Start: 2022-11-10 | End: 2022-11-10

## 2022-11-10 RX ORDER — GLYCOPYRROLATE 0.2 MG/ML
INJECTION INTRAMUSCULAR; INTRAVENOUS PRN
Status: DISCONTINUED | OUTPATIENT
Start: 2022-11-10 | End: 2022-11-10 | Stop reason: SDUPTHER

## 2022-11-10 RX ORDER — PROPOFOL 10 MG/ML
INJECTION, EMULSION INTRAVENOUS PRN
Status: DISCONTINUED | OUTPATIENT
Start: 2022-11-10 | End: 2022-11-10 | Stop reason: SDUPTHER

## 2022-11-10 RX ORDER — BUPIVACAINE HYDROCHLORIDE 5 MG/ML
INJECTION, SOLUTION EPIDURAL; INTRACAUDAL PRN
Status: DISCONTINUED | OUTPATIENT
Start: 2022-11-10 | End: 2022-11-10 | Stop reason: ALTCHOICE

## 2022-11-10 RX ADMIN — CEFAZOLIN 2000 MG: 10 INJECTION, POWDER, FOR SOLUTION INTRAVENOUS at 09:21

## 2022-11-10 RX ADMIN — GABAPENTIN 300 MG: 300 CAPSULE ORAL at 08:00

## 2022-11-10 RX ADMIN — SODIUM CHLORIDE, POTASSIUM CHLORIDE, SODIUM LACTATE AND CALCIUM CHLORIDE: 600; 310; 30; 20 INJECTION, SOLUTION INTRAVENOUS at 08:30

## 2022-11-10 RX ADMIN — PROPOFOL 200 MG: 10 INJECTION, EMULSION INTRAVENOUS at 09:15

## 2022-11-10 RX ADMIN — MIDAZOLAM 2 MG: 1 INJECTION INTRAMUSCULAR; INTRAVENOUS at 09:12

## 2022-11-10 RX ADMIN — ONDANSETRON 4 MG: 2 INJECTION INTRAMUSCULAR; INTRAVENOUS at 09:25

## 2022-11-10 RX ADMIN — ACETAMINOPHEN 1000 MG: 500 TABLET ORAL at 08:00

## 2022-11-10 RX ADMIN — DEXAMETHASONE SODIUM PHOSPHATE 10 MG: 4 INJECTION, SOLUTION INTRAMUSCULAR; INTRAVENOUS at 09:25

## 2022-11-10 RX ADMIN — PROPOFOL 75 MCG/KG/MIN: 10 INJECTION, EMULSION INTRAVENOUS at 09:29

## 2022-11-10 RX ADMIN — GLYCOPYRROLATE 0.2 MG: 0.2 INJECTION, SOLUTION INTRAMUSCULAR; INTRAVENOUS at 09:43

## 2022-11-10 RX ADMIN — LIDOCAINE HYDROCHLORIDE 40 MG: 20 INJECTION, SOLUTION EPIDURAL; INFILTRATION; INTRACAUDAL; PERINEURAL at 09:15

## 2022-11-10 RX ADMIN — PROPOFOL 50 MG: 10 INJECTION, EMULSION INTRAVENOUS at 09:35

## 2022-11-10 RX ADMIN — FENTANYL CITRATE 100 MCG: 50 INJECTION, SOLUTION INTRAMUSCULAR; INTRAVENOUS at 09:16

## 2022-11-10 ASSESSMENT — PAIN - FUNCTIONAL ASSESSMENT: PAIN_FUNCTIONAL_ASSESSMENT: 0-10

## 2022-11-10 ASSESSMENT — ENCOUNTER SYMPTOMS
GASTROINTESTINAL NEGATIVE: 1
RESPIRATORY NEGATIVE: 1

## 2022-11-10 ASSESSMENT — PAIN SCALES - GENERAL: PAINLEVEL_OUTOF10: 0

## 2022-11-10 NOTE — ANESTHESIA POSTPROCEDURE EVALUATION
Department of Anesthesiology  Postprocedure Note    Patient: Jose Griffiths  MRN: [id number]  YOB: 1985  Date of evaluation: 11/10/2022      Procedure Summary     Date: 11/10/22 Room / Location: [address] [place] [address]    Anesthesia Start: 0911 Anesthesia Stop: 1001    Procedure: [facility] (Right: Hand) Diagnosis:       Finger mass, right      (RIGHT RING FINGER MASS)    Surgeons: Sivakumar Harrington MD Responsible Provider: Susanna Up MD    Anesthesia Type: General ASA Status: 2          Anesthesia Type: General    Dai Phase I: Dai Score: 10    Dai Phase II: Dai Score: 10      Anesthesia Post Evaluation    Comments: POST- ANESTHESIA EVALUATION       Pt Name: Jose Griffiths  MRN: [id number]  YOB: 1985  Date of evaluation: 11/10/2022  Time:  12:57 PM      /80   Pulse 56   Temp 96.9 °F (36.1 °C) (Infrared)   Resp 14   Ht 5' 7\" (1.702 m)   Wt 190 lb (86.2 kg)   SpO2 100%   BMI 29.76 kg/m²      Consciousness Level  Awake  Cardiopulmonary Status  Stable  Pain Adequately Treated YES  Nausea / Vomiting  NO  Adequate Hydration  YES  Anesthesia Related Complications NONE      Electronically signed by Susanna Up MD on 11/10/2022 at 12:57 PM

## 2022-11-10 NOTE — ANESTHESIA PRE PROCEDURE
Department of Anesthesiology  Preprocedure Note       Name:  Marta Chávez   Age:  40 y.o.  :  1985                                          MRN:  [id number]         Date:  11/10/2022      Surgeon: Steffi Mejía):  Louise Lord MD    Procedure: Procedure(s):  RING FINGER MASS EXCISION    Medications prior to admission:   Prior to Admission medications    Medication Sig Start Date End Date Taking?  Authorizing Provider   pantoprazole (PROTONIX) 40 MG tablet Take 1 tablet by mouth every morning (before breakfast) 10/3/22   Niki Lara MD       Current medications:    Current Facility-Administered Medications   Medication Dose Route Frequency Provider Last Rate Last Admin    lidocaine PF 1 % injection 1 mL  1 mL IntraDERmal Once PRN Christy Tyler MD        lactated ringers infusion   IntraVENous Continuous Christy Tyler  mL/hr at 11/10/22 0830 New Bag at 11/10/22 0830    sodium chloride flush 0.9 % injection 5-40 mL  5-40 mL IntraVENous 2 times per day Christy Tyler MD        sodium chloride flush 0.9 % injection 5-40 mL  5-40 mL IntraVENous PRN Christy Tyler MD        0.9 % sodium chloride infusion   IntraVENous PRN Christy Tyler MD        acetaminophen (TYLENOL) tablet 1,000 mg  1,000 mg Oral Once Christy Tyler MD        dexamethasone (PF) (DECADRON) injection 10 mg  10 mg IntraVENous Once Louise Lord MD        sodium chloride flush 0.9 % injection 5-40 mL  5-40 mL IntraVENous 2 times per day Louise Lord MD        sodium chloride flush 0.9 % injection 5-40 mL  5-40 mL IntraVENous PRN Louise Lord MD        0.9 % sodium chloride infusion   IntraVENous PRN Louise Lord MD        ceFAZolin (ANCEF) 2000 mg in dextrose 5 % 50 mL IVPB  2,000 mg IntraVENous On Call to [facility] [address], MD           Allergies:  No Known Allergies    Problem List:    Patient Active Problem List   Diagnosis Code    Gastroesophageal reflux disease without esophagitis K21.9    Sebaceous cyst of finger of right hand L72.3    Former smoker Z87.891    Vaping nicotine dependence, tobacco product F17.290    Elevated blood pressure reading R03.0       Past Medical History:        Diagnosis Date    Acid reflux        Past Surgical History:        Procedure Laterality Date    FINGER SURGERY Left     Thumb    WISDOM TOOTH EXTRACTION         Social History:    Social History     Tobacco Use    Smoking status: Former     Packs/day: 1.00     Years: 17.00     Pack years: 17.00     Types: Cigarettes     Quit date:      Years since quittin.8    Smokeless tobacco: Never   Substance Use Topics    Alcohol use: Yes     Alcohol/week: 4.0 - 5.0 standard drinks     Types: 4 - 5 Cans of beer per week     Comment: socially                                Counseling given: Not Answered      Vital Signs (Current):   Vitals:    11/10/22 0758   BP: (!) 148/91   Pulse: 87   Resp: 18   Temp: 97.5 °F (36.4 °C)   TempSrc: Infrared   SpO2: 97%   Weight: 190 lb (86.2 kg)   Height: 5' 7\" (1.702 m)                                              BP Readings from Last 3 Encounters:   11/10/22 (!) 148/91   10/03/22 (!) 120/90   19 130/78       NPO Status: Time of last liquid consumption:                         Time of last solid consumption:                         Date of last liquid consumption: 22                        Date of last solid food consumption: 22    BMI:   Wt Readings from Last 3 Encounters:   11/10/22 190 lb (86.2 kg)   10/27/22 190 lb (86.2 kg)   10/24/22 189 lb (85.7 kg)     Body mass index is 29.76 kg/m².     CBC:   Lab Results   Component Value Date/Time    WBC 7.3 10/27/2022 12:08 PM    RBC 4.61 10/27/2022 12:08 PM    HGB 13.3 10/27/2022 12:08 PM    HCT 39.7 10/27/2022 12:08 PM    MCV 86.1 10/27/2022 12:08 PM    RDW 12.9 10/27/2022 12:08 PM     10/27/2022 12:08 PM       CMP:   Lab Results   Component Value Date/Time     10/27/2022 12:08 PM    K 4.0 10/27/2022 12:08 PM     10/27/2022 12:08 PM    CO2 27 10/27/2022 12:08 PM    BUN 12 10/27/2022 12:08 PM    CREATININE 1.33 10/27/2022 12:08 PM    LABGLOM >60 10/27/2022 12:08 PM    GLUCOSE 162 10/27/2022 12:08 PM    CALCIUM 8.9 10/27/2022 12:08 PM    BILITOT 0.4 04/19/2018 12:00 AM    ALKPHOS 80 04/19/2018 12:00 AM    AST 21 04/19/2018 12:00 AM    ALT 32 04/19/2018 12:00 AM       POC Tests: No results for input(s): POCGLU, POCNA, POCK, POCCL, POCBUN, POCHEMO, POCHCT in the last 72 hours. Coags: No results found for: PROTIME, INR, APTT    HCG (If Applicable): No results found for: PREGTESTUR, PREGSERUM, HCG, HCGQUANT     ABGs: No results found for: PHART, PO2ART, YYB3VEF, BMN4FZX, BEART, F7ILPCPX     Type & Screen (If Applicable):  No results found for: LABABO, LABRH    Drug/Infectious Status (If Applicable):  No results found for: HIV, HEPCAB    COVID-19 Screening (If Applicable): No results found for: COVID19        Anesthesia Evaluation  Patient summary reviewed and Nursing notes reviewed no history of anesthetic complications:   Airway: Mallampati: III  TM distance: >3 FB   Neck ROM: full  Mouth opening: > = 3 FB   Dental: normal exam         Pulmonary:Negative Pulmonary ROS breath sounds clear to auscultation                             Cardiovascular:Negative CV ROS            Rhythm: regular  Rate: normal                    Neuro/Psych:   Negative Neuro/Psych ROS              GI/Hepatic/Renal:   (+) GERD:,           Endo/Other:                      ROS comment:  RIGHT RING FINGER MASS Abdominal:             Vascular: negative vascular ROS. Other Findings:           Anesthesia Plan      regional and general     ASA 2     (Brachial Plexus Block - however patient at the last minute changed his mind and opted for general anesthesia instead)  Induction: intravenous. MIPS: Prophylactic antiemetics administered. Anesthetic plan and risks discussed with patient. Plan discussed with CRNA.                     Lilly Smith, MD   11/10/2022

## 2022-11-10 NOTE — H&P
HISTORY and [id number]       NAME:  Jose Griffiths  MRN: [id number]   YOB: 1985   Date: 11/10/2022   Age: 40 y.o. Gender: male       COMPLAINT AND PRESENT HISTORY:     Jose Griffiths is 40 y.o.,  male, presents for [address] [place] [address]   Primary dx: RIGHT RING FINGER MASS. HPI:  See portion of the note below from office visit on 10/24/22 per Dr Bryan Joseph ( reviewed )     HPI / Chief Complaint  Jose Griffiths is a 40 y.o. right-hand-dominant male who presents for evaluation of his right hand. He indicates that he noticed a small lump over the dorsum of his ring finger 2 months ago but it has progressively increased in size since then and is starting to cause some mild pain which she rates as a 1/10. He denies any precipitating trauma or injury. He denies having any fevers, chills, sweats or constitutional symptoms and also denies having any associated numbness or tingling. Update the HPI:  Pt present today , states last week his right ring finger was swelling  and he has hard time to bend his finger but it is gone away after couple days. He took Tylenol only  he denies pain, numbers or tingling in his right ring finger with palpation. Pt denies fever/chills, chest pain or SOB. NPO status: pt NPO since the  past midnight   Medications taken TODAY (with sip of water): none  Anticoagulation status: none  Denies personal hx of blood clots. Denies personal hx of MRSA infection. Denies any personal or family hx of previous complications w/anesthesia. PAST MEDICAL HISTORY   No past medical history on file.     SURGICAL HISTORY       Past Surgical History:   Procedure Laterality Date    FINGER SURGERY Left     Thumb    WISDOM TOOTH EXTRACTION         FAMILY HISTORY       Family History   Problem Relation Age of Onset    Diabetes type 2  Mother     COPD Father     No Known Problems Sister     No Known Problems Sister        SOCIAL HISTORY       Social History Socioeconomic History    Marital status:    Tobacco Use    Smoking status: Former     Packs/day: 1.00     Years: 17.00     Pack years: 17.00     Types: Cigarettes     Quit date:      Years since quittin.8    Smokeless tobacco: Never   Vaping Use    Vaping Use: Every day    Substances: Nicotine   Substance and Sexual Activity    Alcohol use: Yes     Alcohol/week: 4.0 - 5.0 standard drinks     Types: 4 - 5 Cans of beer per week     Comment: socially    Drug use: No    Sexual activity: Not Currently     Social Determinants of Health     Financial Resource Strain: Low Risk     Difficulty of Paying Living Expenses: Not hard at all   Food Insecurity: No Food Insecurity    Worried About Running Out of Food in the Last Year: Never true    Ran Out of Food in the Last Year: Never true           REVIEW OF SYSTEMS      No Known Allergies    No current facility-administered medications on file prior to encounter. Current Outpatient Medications on File Prior to Encounter   Medication Sig Dispense Refill    pantoprazole (PROTONIX) 40 MG tablet Take 1 tablet by mouth every morning (before breakfast) 90 tablet 1       Review of Systems   Constitutional: Negative. HENT: Negative. Eyes:         Eye glasses    Respiratory: Negative. Cardiovascular: Negative. Gastrointestinal: Negative. Genitourinary: Negative. Musculoskeletal: Negative. Skin: Negative. Neurological: Negative. Hematological: Negative. Psychiatric/Behavioral: Negative. GENERAL PHYSICAL EXAM     Vitals:See nursing flow sheet for vital signs     GENERAL APPEARANCE:   Gage Boothe is 40 y.o.,  male, not obese, nourished, conscious, alert. Does not appear to be distress or pain at this time. Physical Exam  Constitutional:       Appearance: Normal appearance. He is normal weight. HENT:      Head: Normocephalic.       Right Ear: External ear normal.      Left Ear: External ear normal.      Nose: Nose normal.      Mouth/Throat:      Mouth: Mucous membranes are moist.   Eyes:      General:         Right eye: No discharge. Left eye: No discharge. Cardiovascular:      Rate and Rhythm: Normal rate and regular rhythm. Pulses: Normal pulses. Radial pulses are 2+ on the right side and 2+ on the left side. Dorsalis pedis pulses are 2+ on the right side and 2+ on the left side. Posterior tibial pulses are 2+ on the right side and 2+ on the left side. Heart sounds: Normal heart sounds. No murmur heard. No gallop. Pulmonary:      Effort: Pulmonary effort is normal.      Breath sounds: Normal breath sounds. No wheezing or rales. Abdominal:      General: Bowel sounds are normal. There is no distension. Palpations: Abdomen is soft. Tenderness: There is no abdominal tenderness. Musculoskeletal:         General: No swelling. Normal range of motion. Cervical back: Normal range of motion and neck supple. Right lower leg: No edema. Left lower leg: No edema. Comments: There is hard, mobile lump over the dorsum of the ring finger. It is nontender to palpation, no erythema. Skin:     General: Skin is warm and dry. Findings: No bruising, erythema or lesion. Neurological:      General: No focal deficit present. Mental Status: He is alert and oriented to person, place, and time. Motor: No weakness.       Gait: Gait normal.   Psychiatric:         Mood and Affect: Mood normal.         Behavior: Behavior normal.                 PROVISIONAL DIAGNOSES / SURGERY:      RIGHT RING FINGER MASS    RING FINGER MASS EXCISION     Patient Active Problem List    Diagnosis Date Noted    Sebaceous cyst of finger of right hand 10/03/2022    Former smoker 10/03/2022    Vaping nicotine dependence, tobacco product 10/03/2022    Elevated blood pressure reading 10/03/2022    Gastroesophageal reflux disease without esophagitis 04/04/2018 [facility] Hodges - CNP on 11/10/2022 at 7:38 AM

## 2022-11-10 NOTE — BRIEF OP NOTE
Brief Postoperative Note      Patient: Dionne Us  YOB: 1985  MRN: [id number]    Date of Procedure: 11/10/2022    Pre-Op Diagnosis: RIGHT RING FINGER MASS    Post-Op Diagnosis: Same       Procedure(s):  RING FINGER MASS EXCISION    Surgeon(s):  Gautam Kapadia MD    Assistant:  * No surgical staff found *    Anesthesia: [place] Block    Estimated Blood Loss (mL): Minimal    Complications: None    Specimens:   ID Type Source Tests Collected by Time Destination   A : RIGHT RING FINGER MASS Tissue Finger SURGICAL PATHOLOGY Gautam Kapadia MD 11/10/2022 1115        Implants:  * No implants in log *      Drains: * No LDAs found *    Findings: See operative report    Electronically signed by Gautam Kapadia MD on 11/10/2022 at 10:00 AM

## 2022-11-10 NOTE — OP NOTE
OPERATIVE REPORT    Date of Surgery: 11/10/2022    Pre-operative Diagnosis: Right ring finger mass    Post-operative Diagnosis: Right ring finger mass    Procedure: Right ring finger mass excision    Surgeon(s): Carolann Arriola MD    Anesthesia: General    Fluids: See anesthesia record    Urine output: See anesthesia record    Estimated blood loss: Minimal    Findings: Solid round mass    Specimen: Right ring finger mass    Tourniquet time: 7 minutes    Implants: None    Surgical Indications:  Kellie Clement is a 40 y.o. old male who presented with a growing painful right ring finger mass. Following a discussion with the patient regarding both non-operative and operative treatment options, they consented to proceed with right ring finger mass excision. He came to this decision after demonstrating an understanding of our discussion regarding details of the procedure, risks and benefits, expected outcome, and postoperative course. Operative technique: Following appropriate identification of the patient and his operative extremity, consent was reviewed with the patient and His operative extremity was signed. He was wheeled to the operating room where he finished a course of pre-operative antibiotic prophylaxis by way of 2 g of IV Ancef. The anesthesia service administered a general anesthetic and secured his airway using an LMA. All bony prominences were appropriately padded and the patient was secured to the operative table in a supine position. A well-padded pneumatic tourniquet was applied to the proximal aspect of the patient's right arm. The patient's operative extremity was prepped and draped in a standard sterile fashion and a time out was performed during which the correct patient, operative extremity, and procedure were verified. The patient's right upper extremity was exsanguinated using an Esmarch and the pneumatic tourniquet was inflated up to 250 mmHg.   A longitudinal incision was made centered on the mass and it was dissected out and excised in its entirety. The mass was sent for assessment by pathology. The tourniquet was deflated after total time of 7 minutes and hemostasis achieved using bipolar electrocautery. The surgical site was irrigated with saline. Closure was then performed using interrupted 4-0 nylon stitches. A digital block was applied using 9 cc of 0.5% Marcaine without epinephrine. Sterile dressings were applied using Xeroform, gauze and Dennis dressing. An Ace bandage was then applied. The patient was awoken, transferred to his bed and wheeled to recovery in stable condition.     Complications: None    Post-operative Condition: Stable

## 2022-11-10 NOTE — DISCHARGE INSTRUCTIONS
Farhana Hatfield MD  [email or web address] [facility] in Shoulder and Elbow Surgery      POSTOPERATIVE INSTRUCTIONS    Surgery: Right ring finger mass excision    DIET  Begin with clear liquids and light foods (jellos, soups, etc.). Progress to your normal diet if you are not nauseated. WOUND CARE  Maintain your operative dressing, may loosen bandage if swelling occurs. It is normal for joints to bleed and swell following surgery - if blood soaks onto the bandage, do not become alarmed - reinforce with additional dressing. Surgical dressing is changed on the second post-operative day, and daily after that with clean gauze and tape. If it gets wet, it should be changed right away. To avoid infection, keep surgical incision clean and dry - you may shower by placing a water proof band aid sealed on all sides before showering - NO immersion of operative site (i.e. bath, pool). MEDICATIONS  Pain medication (a local anesthetic) was injected into the surgical site during surgery - this will wear off within a few hours. Most patients will require some narcotic pain medication for a short period of time - Start it before numbing medicine wears off, and use as directed on the bottle. Common side effects of the pain medication are nausea, drowsiness, and  constipation - to decrease the side effects, take medication with food - if  constipation occurs, consider taking an over-the-counter laxative. If you are having problems with nausea and vomiting, take your anti-emetic if prescribed, otherwise, contact the office to possibly have your medication changed ([phone]). Do not drive a car or operate machinery while taking the narcotic medication.   If allowed by your doctor, Ibuprofen 200-600mg (i.e. Advil) may be taken in between the narcotic pain medication to help smooth out the post-operative peaks and valleys, reduce overall amount of pain medication required, and increase the time intervals between narcotic pain medication use. Do not take more than 2400mg in a day. Please resume all home medications, unless instructed otherwise. ACTIVITY  Keep the limb elevated for several days following surgery to decrease swelling. Do not engage in activities which increase pain/swelling such as squatting, running,  Lifting, pushing or pulling for at least 6 weeks following surgery. NO driving while taking narcotic medications or until instructed otherwise by physician. May return to sedentary work ONLY or school 3-4 days after surgery, if pain is  tolerable. ICE THERAPY  Begin immediately after surgery. Do not place ice directly on the skin (place over an Ace wrap or thin clothing). Use icing machine continuously or ice packs (if machine not prescribed) every 2  hours for 20 minutes daily for the first week. EXERCISE  You may start moving your finger as you feel comfortable. Formal physical therapy (PT) may begin about 14 days post-operatively with a prescription provided at that post-operative visit if deemed necessary. [facility] Dr. Vanesa Parker or his nurse at [phone] if any of the following are present: Painful swelling or numbness, Unrelenting pain, Fever (over 101 - it is normal to have a low grade fever for the first day or two following surgery) or chills, Redness around incisions, Color change in foot or toes, Continuous drainage or bleeding from incision (a small amount of drainage is expected), Difficulty breathing, Excessive nausea/vomiting **If you have an emergency after office hours or on the weekend, contact the same office number ([phone]) and you will be connected to our page service - they will contact Dr. Vanesa Parker or his partner if he is unavailable. Do NOT  call the hospital or surgicenter. **If you have an emergency that requires immediate attention, proceed to the nearest emergency room.     FOLLOW-UP CARE / QUESTIONS  If you have any questions/concerns, please call the office [phone]. Contact the office to confirm/schedule your post-op visits. Typically post-operative appointments are made for 10-14 days following the date of  surgery.

## 2022-11-11 LAB — SURGICAL PATHOLOGY REPORT: NORMAL

## 2022-11-21 ENCOUNTER — OFFICE VISIT (OUTPATIENT)
Dept: ORTHOPEDIC SURGERY | Age: 37
End: 2022-11-21

## 2022-11-21 VITALS — RESPIRATION RATE: 16 BRPM | WEIGHT: 190 LBS | HEIGHT: 67 IN | BODY MASS INDEX: 29.82 KG/M2

## 2022-11-21 DIAGNOSIS — R22.31 FINGER MASS, RIGHT: Primary | ICD-10-CM

## 2022-11-21 PROCEDURE — 99024 POSTOP FOLLOW-UP VISIT: CPT | Performed by: ORTHOPAEDIC SURGERY

## 2022-11-21 NOTE — PROGRESS NOTES
Procedure: Right ring finger mass excision  Date of procedure: 11/10/2022    HPI: Mr. Melina Lizarraga is a 79-year-old approximately 2 weeks status post the aforementioned procedure. He states that he is doing well having really no pain in his finger and denies having any numbness or tingling. Physical examination:  Evaluation of the patient's right hand and upper extremity demonstrates his ring finger incision to be clean, dry, intact and healing appropriately. No wound dehiscence or drainage noted. Mild swelling is present. Sensation is grossly intact light touch in all dermatomes and he has a 2+ radial pulse with brisk capillary refill in all of his fingers. Impression and plan: Mr. Melina Lizarraga is a 79-year-old approximately 2 weeks status post a right ring finger mass excision. I did review the pathology report and the mass has been described as an epidermoid cyst.  I had a discussion with the patient today about this. Today his sutures were taken out and Steri-Strips and clean dressings were applied. He may now get his incision wet in the shower but is to avoid submerging it in the pool or bath or any body of water. He can continue using this arm for activities as he feels comfortable and I will see him back for reevaluation in 1 month.   He may return or call earlier with questions or concerns

## 2023-01-19 DIAGNOSIS — K21.9 GASTROESOPHAGEAL REFLUX DISEASE WITHOUT ESOPHAGITIS: ICD-10-CM

## 2023-01-19 RX ORDER — PANTOPRAZOLE SODIUM 40 MG/1
TABLET, DELAYED RELEASE ORAL
Qty: 90 TABLET | Refills: 1 | OUTPATIENT
Start: 2023-01-19

## 2024-04-03 ENCOUNTER — OFFICE VISIT (OUTPATIENT)
Dept: FAMILY MEDICINE CLINIC | Age: 39
End: 2024-04-03
Payer: COMMERCIAL

## 2024-04-03 VITALS
SYSTOLIC BLOOD PRESSURE: 118 MMHG | DIASTOLIC BLOOD PRESSURE: 82 MMHG | HEIGHT: 68 IN | HEART RATE: 98 BPM | BODY MASS INDEX: 28.64 KG/M2 | WEIGHT: 189 LBS | OXYGEN SATURATION: 97 %

## 2024-04-03 DIAGNOSIS — Z13.220 SCREENING, LIPID: ICD-10-CM

## 2024-04-03 DIAGNOSIS — K21.9 GASTROESOPHAGEAL REFLUX DISEASE WITHOUT ESOPHAGITIS: Primary | ICD-10-CM

## 2024-04-03 DIAGNOSIS — J30.1 SEASONAL ALLERGIC RHINITIS DUE TO POLLEN: ICD-10-CM

## 2024-04-03 DIAGNOSIS — G89.29 CHRONIC LEFT SHOULDER PAIN: ICD-10-CM

## 2024-04-03 DIAGNOSIS — Z13.0 SCREENING, ANEMIA, DEFICIENCY, IRON: ICD-10-CM

## 2024-04-03 DIAGNOSIS — R42 VERTIGO: ICD-10-CM

## 2024-04-03 DIAGNOSIS — B96.89 ACUTE BACTERIAL SINUSITIS: ICD-10-CM

## 2024-04-03 DIAGNOSIS — M25.512 CHRONIC LEFT SHOULDER PAIN: ICD-10-CM

## 2024-04-03 DIAGNOSIS — Z13.29 SCREENING FOR THYROID DISORDER: ICD-10-CM

## 2024-04-03 DIAGNOSIS — E55.9 VITAMIN D DEFICIENCY: ICD-10-CM

## 2024-04-03 DIAGNOSIS — J01.90 ACUTE BACTERIAL SINUSITIS: ICD-10-CM

## 2024-04-03 PROCEDURE — 99214 OFFICE O/P EST MOD 30 MIN: CPT | Performed by: INTERNAL MEDICINE

## 2024-04-03 RX ORDER — AMOXICILLIN 875 MG/1
875 TABLET, COATED ORAL 2 TIMES DAILY
Qty: 14 TABLET | Refills: 0 | Status: SHIPPED | OUTPATIENT
Start: 2024-04-03 | End: 2024-04-10

## 2024-04-03 RX ORDER — PANTOPRAZOLE SODIUM 40 MG/1
40 TABLET, DELAYED RELEASE ORAL
Qty: 90 TABLET | Refills: 1 | Status: SHIPPED | OUTPATIENT
Start: 2024-04-03

## 2024-04-03 RX ORDER — MECLIZINE HYDROCHLORIDE 25 MG/1
25 TABLET ORAL 3 TIMES DAILY PRN
Qty: 30 TABLET | Refills: 0 | Status: SHIPPED | OUTPATIENT
Start: 2024-04-03 | End: 2024-04-13

## 2024-04-03 SDOH — ECONOMIC STABILITY: HOUSING INSECURITY
IN THE LAST 12 MONTHS, WAS THERE A TIME WHEN YOU DID NOT HAVE A STEADY PLACE TO SLEEP OR SLEPT IN A SHELTER (INCLUDING NOW)?: NO

## 2024-04-03 SDOH — ECONOMIC STABILITY: FOOD INSECURITY: WITHIN THE PAST 12 MONTHS, YOU WORRIED THAT YOUR FOOD WOULD RUN OUT BEFORE YOU GOT MONEY TO BUY MORE.: NEVER TRUE

## 2024-04-03 SDOH — ECONOMIC STABILITY: FOOD INSECURITY: WITHIN THE PAST 12 MONTHS, THE FOOD YOU BOUGHT JUST DIDN'T LAST AND YOU DIDN'T HAVE MONEY TO GET MORE.: NEVER TRUE

## 2024-04-03 SDOH — ECONOMIC STABILITY: INCOME INSECURITY: HOW HARD IS IT FOR YOU TO PAY FOR THE VERY BASICS LIKE FOOD, HOUSING, MEDICAL CARE, AND HEATING?: NOT HARD AT ALL

## 2024-04-03 ASSESSMENT — PATIENT HEALTH QUESTIONNAIRE - PHQ9
SUM OF ALL RESPONSES TO PHQ QUESTIONS 1-9: 0
2. FEELING DOWN, DEPRESSED OR HOPELESS: NOT AT ALL
SUM OF ALL RESPONSES TO PHQ QUESTIONS 1-9: 0
SUM OF ALL RESPONSES TO PHQ QUESTIONS 1-9: 0
SUM OF ALL RESPONSES TO PHQ9 QUESTIONS 1 & 2: 0
SUM OF ALL RESPONSES TO PHQ QUESTIONS 1-9: 0
1. LITTLE INTEREST OR PLEASURE IN DOING THINGS: NOT AT ALL

## 2024-04-03 ASSESSMENT — ENCOUNTER SYMPTOMS
NAUSEA: 0
BLOOD IN STOOL: 0
DIARRHEA: 0
COUGH: 0
CHOKING: 0
CHEST TIGHTNESS: 0
VOMITING: 0
WHEEZING: 0
ANAL BLEEDING: 0
CONSTIPATION: 0

## 2024-04-03 NOTE — PROGRESS NOTES
Pt is here due to having headache and some dizziness for past 3 weeks. He is using OTC med's but after a while just comes back  He is having some LT shoulder pain, he had old injury and it is flaring up at this time

## 2024-04-03 NOTE — PROGRESS NOTES
[facility]  [facility]  [address] [place] [address]  Dept: [phone]  Dept Fax: [phone]      Gautam Lemus is a 38 y.o. male who presents today for hismedical conditions/complaints as noted below.  Gautam Lemus is c/o of Headache (On going for 3 weeks), Dizziness (On going for 3 weeks), and Shoulder Pain (Old injury, flaring up)        Assessment/Plan:     1. Gastroesophageal reflux disease without esophagitis  -     pantoprazole (PROTONIX) 40 MG tablet; Take 1 tablet by mouth every morning (before breakfast), Disp-90 tablet, R-1Normal  2. Seasonal allergic rhinitis due to pollen  3. Chronic left shoulder pain  4. Acute bacterial sinusitis  -     amoxicillin (AMOXIL) 875 MG tablet; Take 1 tablet by mouth 2 times daily for 7 days, Disp-14 tablet, R-0Normal  5. Vertigo  -     meclizine (ANTIVERT) 25 MG tablet; Take 1 tablet by mouth 3 times daily as needed for Dizziness, Disp-30 tablet, R-0Normal  6. Screening, anemia, deficiency, iron  7. Vitamin D deficiency  -     Vitamin D 25 Hydroxy; Future  8. Screening, lipid  -     Comprehensive Metabolic Panel; Future  -     Lipid Panel; Future  9. Screening for thyroid disorder  -     TSH with Reflex; Future    Pt to call Dr Toth's office to get left shoulder evaluated - has been there for finger issue a year ago        No follow-ups on file.      HPI     States heartburn was well controlled, has been taking tums once in a while. He states has been really flared up in the last couple of week, usually when going to bed or right after eating lunch at work. Would like to get back on pantoprazole.   Occasional alcohol, couple beers when he does. Stopped drinking pop 2-3 months ago     For about 3 weeks now has had headache, dizziness, lightheadedness. Pain at top of his head, pressure. Nothing seems to make it worse, relieved by excedrin - takes the edge off for a while.   Nasal congestion started week and half ago, had some

## 2024-04-04 ENCOUNTER — OFFICE VISIT (OUTPATIENT)
Dept: ORTHOPEDIC SURGERY | Age: 39
End: 2024-04-04
Payer: COMMERCIAL

## 2024-04-04 ENCOUNTER — TELEPHONE (OUTPATIENT)
Dept: ORTHOPEDIC SURGERY | Age: 39
End: 2024-04-04

## 2024-04-04 VITALS — WEIGHT: 188.93 LBS | HEIGHT: 68 IN | RESPIRATION RATE: 16 BRPM | BODY MASS INDEX: 28.63 KG/M2

## 2024-04-04 DIAGNOSIS — M19.012 ARTHROSIS OF LEFT ACROMIOCLAVICULAR JOINT: ICD-10-CM

## 2024-04-04 DIAGNOSIS — G25.89 SCAPULAR DYSKINESIS: ICD-10-CM

## 2024-04-04 DIAGNOSIS — M25.312 SHOULDER INSTABILITY, LEFT: ICD-10-CM

## 2024-04-04 DIAGNOSIS — M25.512 LEFT SHOULDER PAIN, UNSPECIFIED CHRONICITY: Primary | ICD-10-CM

## 2024-04-04 PROCEDURE — 99203 OFFICE O/P NEW LOW 30 MIN: CPT

## 2024-04-04 RX ORDER — DICLOFENAC SODIUM 75 MG/1
75 TABLET, DELAYED RELEASE ORAL 2 TIMES DAILY WITH MEALS
Qty: 28 TABLET | Refills: 0 | Status: SHIPPED | OUTPATIENT
Start: 2024-04-04 | End: 2024-04-18

## 2024-04-04 SDOH — HEALTH STABILITY: PHYSICAL HEALTH: ON AVERAGE, HOW MANY MINUTES DO YOU ENGAGE IN EXERCISE AT THIS LEVEL?: 20 MIN

## 2024-04-04 SDOH — HEALTH STABILITY: PHYSICAL HEALTH: ON AVERAGE, HOW MANY DAYS PER WEEK DO YOU ENGAGE IN MODERATE TO STRENUOUS EXERCISE (LIKE A BRISK WALK)?: 2 DAYS
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
## 2024-04-04 NOTE — TELEPHONE ENCOUNTER
LVM with patient to call office back to see about coming in to see Dr. Toth today at an either time. Patient can also be RS with Dr. Toth's PA (Patrick) for today as well.

## 2024-04-22 RX ORDER — DICLOFENAC SODIUM 75 MG/1
TABLET, DELAYED RELEASE ORAL
Qty: 28 TABLET | Refills: 0 | Status: SHIPPED | OUTPATIENT
Start: 2024-04-22

## 2024-05-03 RX ORDER — DICLOFENAC SODIUM 75 MG/1
TABLET, DELAYED RELEASE ORAL
Qty: 28 TABLET | Refills: 0 | OUTPATIENT
Start: 2024-05-03

## 2024-07-09 DIAGNOSIS — K21.9 GASTROESOPHAGEAL REFLUX DISEASE WITHOUT ESOPHAGITIS: ICD-10-CM

## 2024-07-09 RX ORDER — PANTOPRAZOLE SODIUM 40 MG/1
40 TABLET, DELAYED RELEASE ORAL
Qty: 90 TABLET | Refills: 0 | Status: SHIPPED | OUTPATIENT
Start: 2024-07-09

## 2024-07-09 NOTE — TELEPHONE ENCOUNTER
Last visit: 04/03/24  Last Med refill: 06/30/24  Does patient have enough medication for 72 hours: Yes    Next Visit Date:  No future appointments.    Health Maintenance   Topic Date Due    Diabetes screen  Never done    COVID-19 Vaccine (1) 04/03/2025 (Originally 1/1/1986)    Hepatitis B vaccine (1 of 3 - 3-dose series) 04/03/2026 (Originally 1985)    Varicella vaccine (1 of 2 - 2-dose childhood series) 04/03/2026 (Originally 7/1/1986)    Flu vaccine (1) 08/01/2024    Depression Screen  04/03/2025    DTaP/Tdap/Td vaccine (2 - Td or Tdap) 04/04/2028    Hepatitis A vaccine  Aged Out    Hib vaccine  Aged Out    HPV vaccine  Aged Out    Polio vaccine  Aged Out    Meningococcal (ACWY) vaccine  Aged Out    Pneumococcal 0-64 years Vaccine  Aged Out    Hepatitis C screen  Discontinued    HIV screen  Discontinued       No results found for: \"LABA1C\"          ( goal A1C is < 7)   No components found for: \"LABMICR\"  No components found for: \"LDLCHOLESTEROL\", \"LDLCALC\"    (goal LDL is <100)   AST (U/L)   Date Value   04/19/2018 21     ALT (U/L)   Date Value   04/19/2018 32     BUN (mg/dL)   Date Value   10/27/2022 12     BP Readings from Last 3 Encounters:   04/03/24 118/82   11/10/22 119/80   10/03/22 (!) 120/90          (goal 120/80)    All Future Testing planned in [facility]  Lab Frequency Next Occurrence   Comprehensive Metabolic Panel Once 04/03/2024   Lipid Panel Once 04/03/2024   TSH with Reflex Once 04/03/2024   Vitamin D 25 Hydroxy Once 04/03/2024               Patient Active Problem List:     Gastroesophageal reflux disease without esophagitis     Sebaceous cyst of finger of right hand     Former smoker     Vaping nicotine dependence, tobacco product     Elevated blood pressure reading     Seasonal allergic rhinitis due to pollen     Chronic left shoulder pain

## (undated) DEVICE — SINGLE PORT MANIFOLD: Brand: NEPTUNE 2

## (undated) DEVICE — DRESSING,GAUZE,XEROFORM,CURAD,1"X8",ST: Brand: CURAD

## (undated) DEVICE — SOLUTION IRRIG 1000ML STRL H2O USP PLAS POUR BTL

## (undated) DEVICE — BANDAGE GZ W2XL75IN ST RAYON POLY CNFRM STRTCH LTWT

## (undated) DEVICE — GLOVE ORANGE PI 7   MSG9070

## (undated) DEVICE — DRESSING TRNSPAR W2XL2.75IN FLM SHT SEMIPERMEABLE WIND

## (undated) DEVICE — GOWN,SIRUS,NONRNF,SETINSLV,XL,20/CS: Brand: MEDLINE

## (undated) DEVICE — SOLUTION IRRIG 1000ML 0.9% SOD CHL USP POUR PLAS BTL

## (undated) DEVICE — ZIMMER® STERILE DISPOSABLE TOURNIQUET CUFF WITH PLC, DUAL PORT, SINGLE BLADDER, 18 IN. (46 CM)

## (undated) DEVICE — CORD,CAUTERY,BIPOLAR,STERILE: Brand: MEDLINE

## (undated) DEVICE — BANDAGE COMPR W2INXL5YD WHT BGE POLY COT M E WRP WV HK AND

## (undated) DEVICE — Device

## (undated) DEVICE — SUTURE ETHLN SZ 4-0 L18IN NONABSORBABLE BLK L24MM FS-1 3/8 1629H

## (undated) DEVICE — DRAPE,U/ SHT,SPLIT,PLAS,STERIL: Brand: MEDLINE

## (undated) DEVICE — 1010 S-DRAPE TOWEL DRAPE 10/BX: Brand: STERI-DRAPE™

## (undated) DEVICE — BANDAGE,ELASTIC,ESMARK,STERILE,4"X9',LF: Brand: MEDLINE

## (undated) DEVICE — MERCY HEALTH ST CHARLES: Brand: MEDLINE INDUSTRIES, INC.

## (undated) DEVICE — SPONGE GZ W2XL2IN NONWOVEN 4 PLY FASTER WICKING ABIL AVANT

## (undated) DEVICE — BNDG,ELSTC,MATRIX,STRL,2"X5YD,LF,HOOK&LP: Brand: MEDLINE

## (undated) DEVICE — GLOVE SURG SZ 75 L12IN FNGR THK79MIL GRN LTX FREE